# Patient Record
Sex: MALE | Race: WHITE | NOT HISPANIC OR LATINO | Employment: UNEMPLOYED | URBAN - NONMETROPOLITAN AREA
[De-identification: names, ages, dates, MRNs, and addresses within clinical notes are randomized per-mention and may not be internally consistent; named-entity substitution may affect disease eponyms.]

---

## 2024-02-22 ENCOUNTER — OFFICE VISIT (OUTPATIENT)
Dept: URGENT CARE | Facility: CLINIC | Age: 44
End: 2024-02-22
Payer: MEDICAID

## 2024-02-22 VITALS
WEIGHT: 170.3 LBS | RESPIRATION RATE: 14 BRPM | TEMPERATURE: 99.1 F | DIASTOLIC BLOOD PRESSURE: 70 MMHG | BODY MASS INDEX: 24.38 KG/M2 | HEIGHT: 70 IN | HEART RATE: 100 BPM | OXYGEN SATURATION: 96 % | SYSTOLIC BLOOD PRESSURE: 114 MMHG

## 2024-02-22 DIAGNOSIS — J10.1 INFLUENZA B: ICD-10-CM

## 2024-02-22 LAB
FLUAV RNA SPEC QL NAA+PROBE: NEGATIVE
FLUBV RNA SPEC QL NAA+PROBE: POSITIVE
RSV RNA SPEC QL NAA+PROBE: NEGATIVE
S PYO DNA SPEC NAA+PROBE: NOT DETECTED
SARS-COV-2 RNA RESP QL NAA+PROBE: NEGATIVE

## 2024-02-22 PROCEDURE — 87637 SARSCOV2&INF A&B&RSV AMP PRB: CPT | Mod: QW | Performed by: STUDENT IN AN ORGANIZED HEALTH CARE EDUCATION/TRAINING PROGRAM

## 2024-02-22 PROCEDURE — 3078F DIAST BP <80 MM HG: CPT | Performed by: STUDENT IN AN ORGANIZED HEALTH CARE EDUCATION/TRAINING PROGRAM

## 2024-02-22 PROCEDURE — 99203 OFFICE O/P NEW LOW 30 MIN: CPT | Performed by: STUDENT IN AN ORGANIZED HEALTH CARE EDUCATION/TRAINING PROGRAM

## 2024-02-22 PROCEDURE — 3074F SYST BP LT 130 MM HG: CPT | Performed by: STUDENT IN AN ORGANIZED HEALTH CARE EDUCATION/TRAINING PROGRAM

## 2024-02-22 PROCEDURE — 87651 STREP A DNA AMP PROBE: CPT | Performed by: STUDENT IN AN ORGANIZED HEALTH CARE EDUCATION/TRAINING PROGRAM

## 2024-02-22 RX ORDER — DEXAMETHASONE SODIUM PHOSPHATE 10 MG/ML
10 INJECTION INTRAMUSCULAR; INTRAVENOUS ONCE
Status: DISCONTINUED | OUTPATIENT
Start: 2024-02-22 | End: 2024-02-22

## 2024-02-22 RX ORDER — OSELTAMIVIR PHOSPHATE 75 MG/1
75 CAPSULE ORAL 2 TIMES DAILY
Qty: 10 CAPSULE | Refills: 0 | Status: SHIPPED | OUTPATIENT
Start: 2024-02-22 | End: 2024-02-27

## 2024-02-22 ASSESSMENT — FIBROSIS 4 INDEX: FIB4 SCORE: 0.78

## 2024-02-22 NOTE — PROGRESS NOTES
"Subjective:   Jaylen Ivy is a 43 y.o. male who presents for Body Aches (Sore throat, cough with phlegm x last night )      HPI:  43-year-old male presents the urgent care for body aches, chills, fever, sore throat, fatigue, and cough that started last night.  No one else at home with similar symptoms.  Has not used any ibuprofen or Tylenol at this point.  No nausea, vomiting, abdominal pain, diarrhea, sinus pain, sinus pressure, postnasal drip, dizziness, or headache.    Medications:    dexamethasone  omeprazole  ondansetron Tbdp    Allergies: Patient has no known allergies.    Problem List: Jaylen Ivy does not have a problem list on file.    Surgical History:  No past surgical history on file.    Past Social Hx: Jaylen Ivy  reports that he has been smoking cigarettes. He does not have any smokeless tobacco history on file. He reports current drug use. He reports that he does not drink alcohol.     Past Family Hx:  Jaylen Ivy family history is not on file.     Problem list, medications, and allergies reviewed by myself today in Epic.     Objective:     /70 (BP Location: Left arm, Patient Position: Sitting, BP Cuff Size: Adult)   Pulse 100   Temp 37.3 °C (99.1 °F) (Temporal)   Resp 14   Ht 1.778 m (5' 10\")   Wt 77.2 kg (170 lb 4.8 oz)   SpO2 96%   BMI 24.44 kg/m²     Physical Exam  Vitals reviewed.   Constitutional:       Appearance: Normal appearance.   HENT:      Right Ear: Tympanic membrane, ear canal and external ear normal.      Left Ear: Tympanic membrane, ear canal and external ear normal.      Nose: Nose normal. No congestion or rhinorrhea.      Mouth/Throat:      Mouth: Mucous membranes are moist.      Pharynx: Uvula midline. Posterior oropharyngeal erythema present.      Tonsils: No tonsillar exudate. 1+ on the right. 1+ on the left.   Cardiovascular:      Rate and Rhythm: Normal rate and regular rhythm.      Pulses: Normal pulses.      Heart sounds: Normal heart sounds. No murmur " heard.  Pulmonary:      Effort: Pulmonary effort is normal. No respiratory distress.      Breath sounds: Normal breath sounds. No stridor. No wheezing, rhonchi or rales.   Neurological:      Mental Status: He is alert.       Lab Results/POC Test Results   Results for orders placed or performed in visit on 02/22/24   POCT GROUP A STREP, PCR   Result Value Ref Range    POC Group A Strep, PCR Not Detected Not Detected, Invalid   POCT CoV-2, Flu A/B, RSV by PCR   Result Value Ref Range    SARS-CoV-2 by PCR Negative Negative, Invalid    Influenza virus A RNA Negative Negative, Invalid    Influenza virus B, PCR Positive (A) Negative, Invalid    RSV, PCR Negative Negative, Invalid             Assessment/Plan:     Diagnosis and associated orders:     1. Influenza B  dexamethasone (Decadron) injection (check route below) 10 mg    POCT GROUP A STREP, PCR    POCT CoV-2, Flu A/B, RSV by PCR    oseltamivir (TAMIFLU) 75 MG Cap         Comments/MDM:     PCR influenza B positive.  Patient's presentation physical exam findings are consistent with this diagnosis.  Symptoms started within the past 24 hours.  Tamiflu sent to the pharmacy which she would like at this time.  Decadron ordered but patient left prior to administration.  Pulmonary exam shows no wheezing, rales, rhonchi.  No signs of pneumonia.  Discussed typical timeframe for resolution of symptoms.  Discussed home supportive care.  Discussed appropriate dosing of Tylenol and ibuprofen.  Vitals are stable.  Return precautions given.         Differential diagnosis, natural history, supportive care, and indications for immediate follow-up discussed.    Advised the patient to follow-up with the primary care physician for recheck, reevaluation, and consideration of further management.    Please note that this dictation was created using voice recognition software. I have made a reasonable attempt to correct obvious errors, but I expect that there are errors of grammar and  possibly content that I did not discover before finalizing the note.    Electronically signed by Yandel Martin PA-C.